# Patient Record
Sex: MALE | Race: WHITE | NOT HISPANIC OR LATINO | Employment: FULL TIME | URBAN - METROPOLITAN AREA
[De-identification: names, ages, dates, MRNs, and addresses within clinical notes are randomized per-mention and may not be internally consistent; named-entity substitution may affect disease eponyms.]

---

## 2023-11-30 ENCOUNTER — HOSPITAL ENCOUNTER (EMERGENCY)
Facility: HOSPITAL | Age: 28
Discharge: HOME | End: 2023-11-30
Payer: COMMERCIAL

## 2023-11-30 VITALS
OXYGEN SATURATION: 99 % | RESPIRATION RATE: 20 BRPM | WEIGHT: 185 LBS | DIASTOLIC BLOOD PRESSURE: 73 MMHG | SYSTOLIC BLOOD PRESSURE: 142 MMHG | HEIGHT: 75 IN | TEMPERATURE: 97.5 F | BODY MASS INDEX: 23 KG/M2 | HEART RATE: 85 BPM

## 2023-11-30 DIAGNOSIS — J06.9 VIRAL URI WITH COUGH: Primary | ICD-10-CM

## 2023-11-30 LAB
FLUAV RNA RESP QL NAA+PROBE: NOT DETECTED
FLUBV RNA RESP QL NAA+PROBE: NOT DETECTED
SARS-COV-2 RNA RESP QL NAA+PROBE: NOT DETECTED

## 2023-11-30 PROCEDURE — 99283 EMERGENCY DEPT VISIT LOW MDM: CPT

## 2023-11-30 PROCEDURE — 99283 EMERGENCY DEPT VISIT LOW MDM: CPT | Performed by: PHYSICIAN ASSISTANT

## 2023-11-30 PROCEDURE — 87635 SARS-COV-2 COVID-19 AMP PRB: CPT | Performed by: EMERGENCY MEDICINE

## 2023-11-30 ASSESSMENT — LIFESTYLE VARIABLES
HAVE PEOPLE ANNOYED YOU BY CRITICIZING YOUR DRINKING: NO
REASON UNABLE TO ASSESS: NO
HAVE YOU EVER FELT YOU SHOULD CUT DOWN ON YOUR DRINKING: NO
EVER HAD A DRINK FIRST THING IN THE MORNING TO STEADY YOUR NERVES TO GET RID OF A HANGOVER: NO
EVER FELT BAD OR GUILTY ABOUT YOUR DRINKING: NO

## 2023-11-30 ASSESSMENT — COLUMBIA-SUICIDE SEVERITY RATING SCALE - C-SSRS
6. HAVE YOU EVER DONE ANYTHING, STARTED TO DO ANYTHING, OR PREPARED TO DO ANYTHING TO END YOUR LIFE?: NO
2. HAVE YOU ACTUALLY HAD ANY THOUGHTS OF KILLING YOURSELF?: NO
1. IN THE PAST MONTH, HAVE YOU WISHED YOU WERE DEAD OR WISHED YOU COULD GO TO SLEEP AND NOT WAKE UP?: NO

## 2023-11-30 NOTE — Clinical Note
Patel Ponce was seen and treated in our emergency department on 11/30/2023.  He may return to work on 12/01/2023.  Patient has likely viral URI, negative for COVID and Influenza. Supportive care as needed. Should wear mask while cough persists.     If you have any questions or concerns, please don't hesitate to call.      Shar Buckner PA-C

## 2023-11-30 NOTE — ED PROVIDER NOTES
"HPI   Chief Complaint   Patient presents with    Flu Symptoms     Pt presents to the ER with congestion, a productive cough with green sputum, and just \"Not feeling well\" for the past x4 days. PT denies nausea, vomiting, diarrhea, or fevers.        This is a 27-year-old male who denies significant PMH presenting for evaluation of 4-day history of productive cough with green sputum worse at night, upper respiratory congestion, sore throat.  States he wants documentation for work.  Denies fevers, chills, body aches, vomiting, diarrhea, chest pain, shortness of breath, abdominal pain.  Smoker.                          No data recorded                Patient History   Past Medical History:   Diagnosis Date    Wedge compression fracture of unspecified thoracic vertebra, initial encounter for closed fracture ([facility]) 04/18/2018    Thoracic compression fracture     No past surgical history on file.  No family history on file.  Social History     Tobacco Use    Smoking status: Not on file    Smokeless tobacco: Not on file   Substance Use Topics    Alcohol use: Not on file    Drug use: Not on file       Physical Exam   ED Triage Vitals [11/30/23 1407]   Temp Heart Rate Resp BP   36.4 °C (97.5 °F) 85 20 142/73      SpO2 Temp Source Heart Rate Source Patient Position   99 % Tympanic Monitor Sitting      BP Location FiO2 (%)     Left arm --       Physical Exam    General: Vitals noted, no distress. Afebrile. Normal phonation. No stridor or trismus.   EENT: Posterior oropharynx patent, slightly injected, no exudates or tonsillar swelling. Uvula midline and nonedematous  Neck: No meningismus.  No lymphadenopathy.  Cardiac: Regular rate and rhythm. No murmur.  Pulmonary: Lungs clear bilaterally with good aeration. No adventitious breath sounds.  Abdomen: Soft. Nontender    ED Course & MDM   Diagnoses as of 11/30/23 1638   Viral URI with cough       Medical Decision Making  Patient appears well.  Nontoxic.  Stable hemodynamically. "  Unremarkable ENT exam.  Lungs and chest are clear to auscultation.  Soft nontender abdomen.  Negative for COVID and flu.  Likely viral URI.  Advised supportive measures, push fluids, OTC meds.  Instructed to return to the nearest ED if any concerns or new or worsening symptoms. Patient verbalized understanding and agreement with plan. Discharged in stable condition.      Disclaimer: This note was dictated using speech recognition software. An attempt at proofreading was made to minimize errors. Minor errors in transcription may be present. Please call if questions.        Procedure  Procedures     Shar Buckner PA-C  11/30/23 3329